# Patient Record
Sex: FEMALE | Race: WHITE | Employment: STUDENT | URBAN - NONMETROPOLITAN AREA
[De-identification: names, ages, dates, MRNs, and addresses within clinical notes are randomized per-mention and may not be internally consistent; named-entity substitution may affect disease eponyms.]

---

## 2022-11-04 ENCOUNTER — HOSPITAL ENCOUNTER (OUTPATIENT)
Dept: LAB | Age: 27
Discharge: HOME | End: 2022-11-04
Payer: COMMERCIAL

## 2022-11-04 ENCOUNTER — OFFICE VISIT (OUTPATIENT)
Dept: OBGYN | Age: 27
End: 2022-11-04

## 2022-11-04 VITALS
SYSTOLIC BLOOD PRESSURE: 118 MMHG | OXYGEN SATURATION: 98 % | HEIGHT: 67 IN | BODY MASS INDEX: 23.42 KG/M2 | RESPIRATION RATE: 16 BRPM | HEART RATE: 82 BPM | WEIGHT: 149.2 LBS | DIASTOLIC BLOOD PRESSURE: 76 MMHG

## 2022-11-04 DIAGNOSIS — Z34.90 EARLY STAGE OF PREGNANCY: ICD-10-CM

## 2022-11-04 DIAGNOSIS — Z34.90 EARLY STAGE OF PREGNANCY: Primary | ICD-10-CM

## 2022-11-04 LAB — HCG QUANTITATIVE: ABNORMAL MIU/ML

## 2022-11-04 PROCEDURE — 0500F INITIAL PRENATAL CARE VISIT: CPT | Performed by: NURSE PRACTITIONER

## 2022-11-04 PROCEDURE — 84702 CHORIONIC GONADOTROPIN TEST: CPT

## 2022-11-04 PROCEDURE — 36415 COLL VENOUS BLD VENIPUNCTURE: CPT

## 2022-11-04 RX ORDER — FAMOTIDINE 20 MG
1 TABLET ORAL DAILY
Qty: 90 TABLET | Refills: 2 | Status: SHIPPED | OUTPATIENT
Start: 2022-11-04 | End: 2023-03-04

## 2022-11-04 ASSESSMENT — ENCOUNTER SYMPTOMS
CONSTIPATION: 0
ABDOMINAL PAIN: 0
SHORTNESS OF BREATH: 0
DIARRHEA: 0

## 2022-11-04 NOTE — PROGRESS NOTES
[place] [address]  [facility] [place]   [facility] [address]  Dept: [phone]  Dept Fax: [phone]     11/4/2022        Subjective:        Willian Dumont is a 24 y.o. female here today for   Chief Complaint   Patient presents with    Amenorrhea     LMP 8/23/2022. Positive home pregnancy test.     .    Chaperone present? No, not required    Moo Luna and her  [facility] present for pregnancy confirmation visit. They believe she is likely 8-9 weeks pregnant. Current Outpatient Medications   Medication Sig Dispense Refill    Prenatal Vit-Fe Fumarate-FA (PRENATAL COMPLETE) 14-0.4 MG TABS Take 1 tablet by mouth daily 90 tablet 2     No current facility-administered medications for this visit. History reviewed. No pertinent past medical history. Past Surgical History:   Procedure Laterality Date    TONSILLECTOMY         Last PAP: 7/15/2019  HPV: NA  High Risk HPV: NA    Patient's last menstrual period was 08/23/2022 (exact date). Age of menarche: 15  Frequency: regular  Duration: 6 days   Flow is: moderate  Menstrual products: tampon and pad  Associated SX with menses: cramping  Menstrual pain: mild    Sexually active? yes  Sexual partners: single partner and male  Contraceptive method: none  STI HX: no    Infertility: no  Reproductive life plan: one child    Do you do self breast exams? No  Breast Family HX:  no family history of breast cancer  Breast Patient HX: no prior history of breast cancer, biopsy or abnormal mammograms    Mental Health / Mood: calm and cooperative    Tobacco usage:    Tobacco Use: Medium Risk    Smoking Tobacco Use: Never    Smokeless Tobacco Use: Never    Passive Exposure: Yes      ETOH usage: No current use; quit 8/2022  Recreational drug usage: no    Dental none at this time  Hearing: normal  Vision HX: normal  Immunizations: none  History of abuse: none  Activity:  walking, liftng  Diet: Eats fruits and vegetables, Dairy, and Drinks water 128 oz. Family/Friend support: Yes  Home Environment: Lives with  Isaiah Sainz and his daughter Vanessa Trivedi , 2 dogs  School/Work: [employer]  Current Stressors: none  Do you use sunscreen? Yes  Do you practice safe driving habits? Use of seatbelt Yes      Review of Systems   Constitutional:  Negative for chills, fatigue and fever. Eyes:  Negative for visual disturbance. Respiratory:  Negative for shortness of breath. Cardiovascular:  Negative for chest pain. Gastrointestinal:  Negative for abdominal pain, constipation and diarrhea. Endocrine: Negative for cold intolerance and heat intolerance. Genitourinary:  Negative for difficulty urinating, dysuria, frequency, menstrual problem, vaginal bleeding, vaginal discharge and vaginal pain. Musculoskeletal: Negative. Skin: Negative. Neurological:  Negative for headaches. Psychiatric/Behavioral: Negative. Objective:      Vitals:    11/04/22 1438   BP: 118/76   Pulse: 82   Resp: 16   SpO2: 98%        Physical Exam  Vitals and nursing note reviewed. Constitutional:       Appearance: Normal appearance. HENT:      Head: Normocephalic. Cardiovascular:      Rate and Rhythm: Normal rate and regular rhythm. Pulmonary:      Effort: Pulmonary effort is normal.      Breath sounds: Normal breath sounds. Abdominal:      General: Abdomen is flat. Bowel sounds are normal.      Palpations: Abdomen is soft. Musculoskeletal:         General: Normal range of motion. Cervical back: Normal range of motion. No tenderness. Skin:     General: Skin is warm and dry. Neurological:      General: No focal deficit present. Mental Status: She is alert and oriented to person, place, and time. Psychiatric:         Mood and Affect: Mood normal.         Behavior: Behavior normal.           Assessment:       Diagnosis Orders   1.  Early stage of pregnancy HCG, Quantitative, Pregnancy    US OB LESS THAN 14 WEEKS SINGLE OR FIRST GESTATION                           Plan:     Orders Placed This Encounter   Procedures    US OB LESS THAN 14 WEEKS SINGLE OR FIRST GESTATION           Standing Status:   Future     Standing Expiration Date:   11/4/2023     Order Specific Question:   Reason for exam:     Answer:   dating    HCG, Quantitative, Pregnancy     Standing Status:   Future     Number of Occurrences:   1     Standing Expiration Date:   11/4/2023    Dating US to be scheduled based on HCG result  Discussed delivery at [facility], prenatal classes  Discussed diet, fluids, exercise, common early pregnancy complaints, genetic family hx  PNV ordered to start immediately  Pregnancy folder and pamphlets given                Follow-up:      Return in about 2 weeks (around 11/18/2022) for f/u for initial prenatal visit in 2 wks. ADDENDUM: 1630 HCG result positive for pregnancy, pt notified. US and initial prenatal visit scheduled.       Electronically signed by MORTEZA Dickerson CNM 11/4/2022 5:47 PM .

## 2022-11-07 RX ORDER — PNV NO.95/FERROUS FUM/FOLIC AC 28MG-0.8MG
TABLET ORAL
Qty: 100 CAPSULE | Refills: 5 | Status: SHIPPED | OUTPATIENT
Start: 2022-11-07

## 2022-11-18 ENCOUNTER — HOSPITAL ENCOUNTER (OUTPATIENT)
Dept: ULTRASOUND IMAGING | Age: 27
Discharge: HOME | End: 2022-11-20
Payer: COMMERCIAL

## 2022-11-18 ENCOUNTER — HOSPITAL ENCOUNTER (OUTPATIENT)
Age: 27
Setting detail: SPECIMEN
Discharge: HOME | End: 2022-11-18
Payer: COMMERCIAL

## 2022-11-18 ENCOUNTER — INITIAL PRENATAL (OUTPATIENT)
Dept: OBGYN | Age: 27
End: 2022-11-18

## 2022-11-18 ENCOUNTER — HOSPITAL ENCOUNTER (OUTPATIENT)
Dept: LAB | Age: 27
Discharge: HOME | End: 2022-11-18
Payer: COMMERCIAL

## 2022-11-18 VITALS
BODY MASS INDEX: 23.73 KG/M2 | RESPIRATION RATE: 16 BRPM | WEIGHT: 151.2 LBS | DIASTOLIC BLOOD PRESSURE: 78 MMHG | HEART RATE: 79 BPM | SYSTOLIC BLOOD PRESSURE: 118 MMHG | HEIGHT: 67 IN | OXYGEN SATURATION: 99 %

## 2022-11-18 DIAGNOSIS — Z34.90 EARLY STAGE OF PREGNANCY: ICD-10-CM

## 2022-11-18 DIAGNOSIS — O26.811 FATIGUE DURING PREGNANCY IN FIRST TRIMESTER: ICD-10-CM

## 2022-11-18 DIAGNOSIS — Z34.92 SECOND TRIMESTER PREGNANCY: Primary | ICD-10-CM

## 2022-11-18 LAB
ABO/RH: NORMAL
ABSOLUTE EOS #: 0.03 K/UL (ref 0–0.44)
ABSOLUTE IMMATURE GRANULOCYTE: <0.03 K/UL (ref 0–0.3)
ABSOLUTE LYMPH #: 2.04 K/UL (ref 1.1–3.7)
ABSOLUTE MONO #: 0.66 K/UL (ref 0.1–1.2)
AMPHETAMINE SCREEN URINE: NEGATIVE
ANTIBODY SCREEN: NEGATIVE
BACTERIA: ABNORMAL
BARBITURATE SCREEN URINE: NEGATIVE
BASOPHILS # BLD: 0 % (ref 0–2)
BASOPHILS ABSOLUTE: 0.03 K/UL (ref 0–0.2)
BENZODIAZEPINE SCREEN, URINE: NEGATIVE
BILIRUBIN URINE: NEGATIVE
BUPRENORPHINE URINE: NEGATIVE
CANNABINOID SCREEN URINE: NEGATIVE
COCAINE METABOLITE, URINE: NEGATIVE
EOSINOPHILS RELATIVE PERCENT: 0 % (ref 1–4)
EPITHELIAL CELLS UA: ABNORMAL /HPF (ref 0–5)
GLUCOSE URINE: NEGATIVE
HCT VFR BLD CALC: 38.8 % (ref 36.3–47.1)
HEMOGLOBIN: 13.6 G/DL (ref 11.9–15.1)
HEPATITIS B SURFACE ANTIGEN: NONREACTIVE
HEPATITIS C ANTIBODY: NONREACTIVE
HIV AG/AB: NONREACTIVE
IMMATURE GRANULOCYTES: 0 %
KETONES, URINE: NEGATIVE
LEUKOCYTE ESTERASE, URINE: NEGATIVE
LYMPHOCYTES # BLD: 19 % (ref 24–43)
MCH RBC QN AUTO: 31.2 PG (ref 25.2–33.5)
MCHC RBC AUTO-ENTMCNC: 35.1 G/DL (ref 25.2–33.5)
MCV RBC AUTO: 89 FL (ref 82.6–102.9)
METHADONE SCREEN, URINE: NEGATIVE
METHAMPHETAMINE, URINE: NEGATIVE
MONOCYTES # BLD: 6 % (ref 3–12)
NITRITE, URINE: NEGATIVE
OPIATES, URINE: NEGATIVE
OXYCODONE SCREEN URINE: NEGATIVE
PDW BLD-RTO: 11.7 % (ref 11.8–14.4)
PH UA: 5.5 (ref 5–6)
PHENCYCLIDINE, URINE: NEGATIVE
PLATELET # BLD: 342 K/UL (ref 138–453)
PMV BLD AUTO: 9.1 FL (ref 8.1–13.5)
PROPOXYPHENE, URINE: NEGATIVE
PROTEIN UA: NEGATIVE
RBC # BLD: 4.36 M/UL (ref 3.95–5.11)
RBC UA: ABNORMAL /HPF (ref 0–4)
RUBV IGG SER QL: >500 IU/ML
SEG NEUTROPHILS: 75 % (ref 36–65)
SEGMENTED NEUTROPHILS ABSOLUTE COUNT: 8.14 K/UL (ref 1.5–8.1)
SPECIFIC GRAVITY UA: 1 (ref 1.01–1.02)
T. PALLIDUM, IGG: NONREACTIVE
TEST INFORMATION: NORMAL
TRICYCLIC ANTIDEPRESSANTS, UR: NEGATIVE
URINE HGB: ABNORMAL
UROBILINOGEN, URINE: NORMAL
VITAMIN D 25-HYDROXY: 38.5 NG/ML
WBC # BLD: 10.9 K/UL (ref 3.5–11.3)
WBC UA: ABNORMAL /HPF (ref 0–4)

## 2022-11-18 PROCEDURE — 85025 COMPLETE CBC W/AUTO DIFF WBC: CPT

## 2022-11-18 PROCEDURE — 86762 RUBELLA ANTIBODY: CPT

## 2022-11-18 PROCEDURE — 86850 RBC ANTIBODY SCREEN: CPT

## 2022-11-18 PROCEDURE — 80306 DRUG TEST PRSMV INSTRMNT: CPT

## 2022-11-18 PROCEDURE — 87591 N.GONORRHOEAE DNA AMP PROB: CPT

## 2022-11-18 PROCEDURE — 87491 CHLMYD TRACH DNA AMP PROBE: CPT

## 2022-11-18 PROCEDURE — 82306 VITAMIN D 25 HYDROXY: CPT

## 2022-11-18 PROCEDURE — 86803 HEPATITIS C AB TEST: CPT

## 2022-11-18 PROCEDURE — 86900 BLOOD TYPING SEROLOGIC ABO: CPT

## 2022-11-18 PROCEDURE — G0145 SCR C/V CYTO,THINLAYER,RESCR: HCPCS

## 2022-11-18 PROCEDURE — 86787 VARICELLA-ZOSTER ANTIBODY: CPT

## 2022-11-18 PROCEDURE — 0502F SUBSEQUENT PRENATAL CARE: CPT | Performed by: NURSE PRACTITIONER

## 2022-11-18 PROCEDURE — 36415 COLL VENOUS BLD VENIPUNCTURE: CPT

## 2022-11-18 PROCEDURE — 81001 URINALYSIS AUTO W/SCOPE: CPT

## 2022-11-18 PROCEDURE — 87086 URINE CULTURE/COLONY COUNT: CPT

## 2022-11-18 PROCEDURE — 86901 BLOOD TYPING SEROLOGIC RH(D): CPT

## 2022-11-18 PROCEDURE — 87389 HIV-1 AG W/HIV-1&-2 AB AG IA: CPT

## 2022-11-18 PROCEDURE — 86780 TREPONEMA PALLIDUM: CPT

## 2022-11-18 PROCEDURE — 87340 HEPATITIS B SURFACE AG IA: CPT

## 2022-11-18 PROCEDURE — 76801 OB US < 14 WKS SINGLE FETUS: CPT

## 2022-11-18 ASSESSMENT — PATIENT HEALTH QUESTIONNAIRE - PHQ9
SUM OF ALL RESPONSES TO PHQ QUESTIONS 1-9: 0
1. LITTLE INTEREST OR PLEASURE IN DOING THINGS: 0
SUM OF ALL RESPONSES TO PHQ QUESTIONS 1-9: 0
SUM OF ALL RESPONSES TO PHQ9 QUESTIONS 1 & 2: 0
SUM OF ALL RESPONSES TO PHQ QUESTIONS 1-9: 0
SUM OF ALL RESPONSES TO PHQ QUESTIONS 1-9: 0
2. FEELING DOWN, DEPRESSED OR HOPELESS: 0

## 2022-11-18 NOTE — LETTER
[address] OB GYN A department of [place] [address]  Phone: [phone]  Fax: [phone]    Lluvia Stout, [place]        November 18, 2022     Patient: Tay Chairez   YOB: 1995   Date of Visit: 11/18/2022       To Whom it May Concern:    Fatimah Sanabria was seen in my clinic on 11/18/2022. She may return to work on 11/18/2022    If you have any questions or concerns, please don't hesitate to call.     Sincerely,         MORTEZA Mullins CNM

## 2022-11-18 NOTE — LETTER
[address] OB GYN A department of [place] [address]  Phone: [phone]  Fax: Hali Cheung 1941, APRN - [place]        November 18, 2022     Patient: Andrew Chambers   YOB: 1995   Date of Visit: 11/18/2022       To Whom It May Concern: It is my medical opinion that Footnote {Work release (duty restriction):50670}. If you have any questions or concerns, please don't hesitate to call.     Sincerely,        MORTEZA Peoples CNM

## 2022-11-18 NOTE — PROGRESS NOTES
Marion Small Flogaus  2022    YOB: 2020   Patient's last menstrual period was 2022 (exact date). Unknown        Primary Care Physician: Norma Petty MD        CC: Initial Prenatal Visit    Subjective:     Thalia Edwards is a 24 y.o. female Mel Coto    is being seen today for her first obstetrical visit. This is a planned pregnancy. She is at [address] [place] [address]. Her obstetrical history has no current OB risk factors. Relationship with FOB: spouse, living together. Patient does intend to breast feed. Pregnancy history fully reviewed. Objective:   Blood pressure 118/78, pulse 79, resp. rate 16, height 5' 7\" (1.702 m), weight 151 lb 3.2 oz (68.6 kg), last menstrual period 2022, SpO2 99 %, not currently breastfeeding. OB History    Para Term  AB Living   1 0 0 0 0 0   SAB IAB Ectopic Molar Multiple Live Births   0 0 0 0 0 0      # Outcome Date GA Lbr Murray/2nd Weight Sex Delivery Anes PTL Lv   1 Current                History reviewed. No pertinent past medical history.   Past Surgical History:   Procedure Laterality Date    TONSILLECTOMY        Social History     Socioeconomic History    Marital status: Single     Spouse name: Not on file    Number of children: Not on file    Years of education: Not on file    Highest education level: Not on file   Occupational History    Not on file   Tobacco Use    Smoking status: Never     Passive exposure: Yes    Smokeless tobacco: Never   Vaping Use    Vaping Use: Never used   Substance and Sexual Activity    Alcohol use: No    Drug use: No    Sexual activity: Yes     Partners: Male     Birth control/protection: Pill   Other Topics Concern    Not on file   Social History Narrative    Not on file     Social Determinants of Health     Financial Resource Strain: Not on file   Food Insecurity: Not on file   Transportation Needs: Not on file   Physical Activity: Not on file   Stress: Not on file   Social Connections: Not on file   Intimate Partner Violence: Not on file   Housing Stability: Not on file     Family History   Problem Relation Age of Onset    Zuniga-Parkinson-White Syndrome Maternal Grandmother     Diabetes Maternal Grandfather     Seizures Paternal Grandmother     Diabetes Paternal Grandfather     High Blood Pressure Paternal Grandfather        MEDICATIONS:  Current Outpatient Medications   Medication Sig Dispense Refill    Prenatal MV-Min-Fe Fum-FA-DHA (PRENATAL MULTIVITAMIN PLUS DHA) 27-0.8-250 MG CAPS One tablet daily by mouth 100 capsule 5    Prenatal Vit-Fe Fumarate-FA (PRENATAL COMPLETE) 14-0.4 MG TABS Take 1 tablet by mouth daily 90 tablet 2     No current facility-administered medications for this visit. ALLERGIES:  Allergies as of 11/18/2022 - Fully Reviewed 11/18/2022   Allergen Reaction Noted    Penicillins  07/26/2013     Physical Exam Completed-See Epic Navigator    Chaperone for Intimate Exam  Chaperone was offered and accepted as part of the rooming process. Chaperone: DELBERT Short LPN    Assessment:      Pregnancy at 12 and 3/7 weeks    Diagnosis Orders   1. Second trimester pregnancy  US OB 14 PLUS WEEKS SINGLE OR FIRST GESTATION      2. Early stage of pregnancy  C.trachomatis N.gonorrhoeae DNA, Urine    Culture, Urine    Hepatitis C Antibody    HIV Screen    Prenatal Profile I    Urinalysis with Microscopic    Urine Drug Screen    Varicella Zoster Antibody, IgG    Vitamin D 25 Hydroxy    PAP SMEAR    Panorama Prenatal Test Full Panel: Panorama Test Plus 5 Additional Microdeletions      3. Fatigue during pregnancy in first trimester             Plan:      Initial labs drawn. Continue Prenatal vitamins. Problem list reviewed and updated. Role of ultrasound in pregnancy discussed; fetal survey: ordered  NIPT Testing completed today  Pap testing completed today  If Negative Cytology, Follow-up screening per current guidelines.    Follow-up in 4 weeks    MORTEZA Dickerson - CNM

## 2022-11-19 LAB
CULTURE: NO GROWTH
SPECIMEN DESCRIPTION: NORMAL

## 2022-11-21 LAB
C. TRACHOMATIS DNA ,URINE: NEGATIVE
N. GONORRHOEAE DNA, URINE: NEGATIVE
SPECIMEN DESCRIPTION: NORMAL
VZV IGG SER QL IA: 2.57

## 2022-12-05 NOTE — RESULT ENCOUNTER NOTE
Results reviewed, please contact patient with normal result. Baby is low risk for any of the chromosomal alterations that were tested. Do not check report in My Chart if gender is to remain secret.  LYNN/HORACIO

## 2022-12-12 ENCOUNTER — ROUTINE PRENATAL (OUTPATIENT)
Dept: OBGYN | Age: 27
End: 2022-12-12

## 2022-12-12 VITALS
HEART RATE: 100 BPM | WEIGHT: 151.4 LBS | OXYGEN SATURATION: 99 % | BODY MASS INDEX: 23.76 KG/M2 | DIASTOLIC BLOOD PRESSURE: 62 MMHG | SYSTOLIC BLOOD PRESSURE: 112 MMHG | HEIGHT: 67 IN | RESPIRATION RATE: 16 BRPM

## 2022-12-12 DIAGNOSIS — Z34.91 NORMAL PREGNANCY, FIRST TRIMESTER: Primary | ICD-10-CM

## 2022-12-12 PROCEDURE — 0502F SUBSEQUENT PRENATAL CARE: CPT | Performed by: NURSE PRACTITIONER

## 2022-12-12 NOTE — PROGRESS NOTES
Baltazar Berman is here at 15w5d for:    Chief Complaint   Patient presents with    Routine Prenatal Visit       S: Layne Merlos and  Vasyl present for return OB visit. She denies VB, LOF, cramping/ctx. No fetal movement felt yet. Fatigue is much improved. They do NOT want to know baby's gender. She denies any questions/concerns. O: Prenatal labs all WNL, pap done 22: neg  A:  @ 15w5d  P: 1. RTC in 4 weeks  2. Anatomy US before next visit on 23  3. Education: physical changes, safe exercise, safe sex, work duties, always wear seatbelt    MORTEZA Mullins CNM    Pt denies VB, LOF, cramping/ctx, headache, vision changes, fever, chills, chest pain, shortness of breath, abdominal pain, urgency/burning with urination, nausea and vomiting: yes  Pt reports fetal movement: No    Abdomen: gravid   Pelvic Exam: deferred    See Prenatal Visit Tab for prenatal physical, vital signs and fetal assessment section    ASSESSMENT & Plan    Diagnosis Orders   1. Normal pregnancy, first trimester  US OB DETAIL FETAL ANATOMY SINGLE OR FIRST GESTATION        Return for next prenatal visit in 4 weeks.     20 minutes spent in education, evaluation, and assessment    Saud Mullins 9:02 AM

## 2023-01-17 ENCOUNTER — HOSPITAL ENCOUNTER (OUTPATIENT)
Dept: ULTRASOUND IMAGING | Age: 28
Discharge: HOME | End: 2023-01-19
Payer: COMMERCIAL

## 2023-01-17 ENCOUNTER — ROUTINE PRENATAL (OUTPATIENT)
Dept: OBGYN | Age: 28
End: 2023-01-17

## 2023-01-17 ENCOUNTER — HOSPITAL ENCOUNTER (OUTPATIENT)
Age: 28
Discharge: HOME | End: 2023-01-17
Payer: COMMERCIAL

## 2023-01-17 VITALS
WEIGHT: 156.4 LBS | SYSTOLIC BLOOD PRESSURE: 112 MMHG | HEIGHT: 67 IN | OXYGEN SATURATION: 99 % | DIASTOLIC BLOOD PRESSURE: 62 MMHG | HEART RATE: 75 BPM | BODY MASS INDEX: 24.55 KG/M2

## 2023-01-17 DIAGNOSIS — Z34.92 PRENATAL CARE, SECOND TRIMESTER: ICD-10-CM

## 2023-01-17 DIAGNOSIS — Z34.92 PRENATAL CARE, SECOND TRIMESTER: Primary | ICD-10-CM

## 2023-01-17 DIAGNOSIS — Z34.92 SECOND TRIMESTER PREGNANCY: ICD-10-CM

## 2023-01-17 LAB — TSH SERPL DL<=0.05 MIU/L-ACNC: 1.89 UIU/ML (ref 0.3–5)

## 2023-01-17 PROCEDURE — 0500F INITIAL PRENATAL CARE VISIT: CPT | Performed by: NURSE PRACTITIONER

## 2023-01-17 PROCEDURE — 76805 OB US >/= 14 WKS SNGL FETUS: CPT

## 2023-01-17 PROCEDURE — 84443 ASSAY THYROID STIM HORMONE: CPT

## 2023-01-17 PROCEDURE — 36415 COLL VENOUS BLD VENIPUNCTURE: CPT

## 2023-01-17 PROCEDURE — 82105 ALPHA-FETOPROTEIN SERUM: CPT

## 2023-01-17 NOTE — PROGRESS NOTES
S: Mark Fuentes presents for prenatal visit today with  Vasyl. Feeling well. Not yet feeling baby move. Reports no contractions. denies concerns. Denies VB, LOF. O: MVSS, Afebrile. Abdomen gravid, nontender, S=D, +FHT's. Anatomy  US = 20 Weeks 2 Days = EDC 6/4/2023, .88 gm. +/- 53.68 gm., (13# 0 oz. +/- 2# 0 oz. ), 20.9%, . Anterior placenta. A: 24 y.o. Delmis Boone at 19w11d SIUP, +FHT's   P: Education: discussed and reviewed PTL, danger S&S, when to call. Discussed preliminary Anatomy US report with pt at visit. Discussed Nausea/fatigue improved; may start feeling fetal movement; may have sleep problems or leg cramps, Tx: exercise, warm shower/bath, extra pillows, stretches and heat for leg cramps. Discussed 28w visit: 1h glucola, CBC  Return for f/u at next prenatal visit in 4 weeks. with Vernadine Niki  20 minutes spent in education, evaluation, and assessment. Dermal Autograft Text: The defect edges were debeveled with a #15 scalpel blade.  Given the location of the defect, shape of the defect and the proximity to free margins a dermal autograft was deemed most appropriate.  Using a sterile surgical marker, the primary defect shape was transferred to the donor site. The area thus outlined was incised deep to adipose tissue with a #15 scalpel blade.  The harvested graft was then trimmed of adipose and epidermal tissue until only dermis was left.  The skin graft was then placed in the primary defect and oriented appropriately.

## 2023-01-19 NOTE — RESULT ENCOUNTER NOTE
Results reviewed, notify pt that result is still WNL but US tech was unable to completely see the heart. A limited f/u US may be done to visualize the entire heart well.  LYNN/HORACIO

## 2023-01-20 LAB
AFP INTERPRETATION: NORMAL
AFP MOM: 0.78
AFP SPECIMEN: NORMAL
AFP: 53 NG/ML
DATE OF BIRTH: NORMAL
DATING METHOD: NORMAL
DETERMINED BY: NORMAL
DIABETIC: NEGATIVE
DONOR EGG?: NORMAL
DUE DATE: NORMAL
ESTIMATED DUE DATE: NORMAL
FAMILY HISTORY NTD: NEGATIVE
GESTATIONAL AGE: NORMAL
IN VITRO FERTILIZATION: NORMAL
INSULIN REQ DIABETES: NO
LAST MENSTRUAL PERIOD: NORMAL
MATERNAL AGE AT EDD: 25.9 YR
MATERNAL WEIGHT: 156
MONOCHORIONIC TWINS: NORMAL
NUMBER OF FETUSES: NORMAL
PATIENT WEIGHT UNITS: NORMAL
PATIENT WEIGHT: NORMAL
RACE (MATERNAL): NORMAL
RACE: NORMAL
REPEAT SPECIMEN?: NORMAL
SMOKING: NORMAL
SMOKING: NORMAL
VALPROIC/CARBAMAZEP: NORMAL
ZZ NTE CLEAN UP: HISTORY: NO

## 2023-01-20 NOTE — RESULT ENCOUNTER NOTE
Results reviewed, please contact patient with normal result: negative screen for open spina bifida.  LYNN/HORACIO

## 2023-02-14 ENCOUNTER — ROUTINE PRENATAL (OUTPATIENT)
Dept: OBGYN | Age: 28
End: 2023-02-14

## 2023-02-14 VITALS
BODY MASS INDEX: 24.93 KG/M2 | WEIGHT: 159.2 LBS | SYSTOLIC BLOOD PRESSURE: 120 MMHG | DIASTOLIC BLOOD PRESSURE: 70 MMHG | HEART RATE: 90 BPM

## 2023-02-14 DIAGNOSIS — Z3A.25 25 WEEKS GESTATION OF PREGNANCY: ICD-10-CM

## 2023-02-14 DIAGNOSIS — Z34.92 SECOND TRIMESTER PREGNANCY: Primary | ICD-10-CM

## 2023-02-14 PROCEDURE — 0502F SUBSEQUENT PRENATAL CARE: CPT | Performed by: ADVANCED PRACTICE MIDWIFE

## 2023-02-14 NOTE — PROGRESS NOTES
S:  Presents for prenatal visit today. Reports feeling well, no concerns. Perceiving fetal movements, no concerns. O:   MVSS, Afebrile. Abdomen gravid, nontender. S=D, +FHT's, +FM  A:  23 yo  at 25 Weeks 0 Days SIUP, +FHT's  P:  Education: Discussed and reviewed diet, hydration, expected weight gain 1/2#/week, FMI, danger s&S, pre-registration paperwork for [facility] given and gift packs. 28 weeks labs at [place]. RTO 3 weeks.

## 2023-03-07 ENCOUNTER — HOSPITAL ENCOUNTER (OUTPATIENT)
Age: 28
Discharge: HOME | End: 2023-03-07
Payer: COMMERCIAL

## 2023-03-07 ENCOUNTER — ROUTINE PRENATAL (OUTPATIENT)
Dept: OBGYN | Age: 28
End: 2023-03-07

## 2023-03-07 VITALS
OXYGEN SATURATION: 99 % | HEIGHT: 67 IN | RESPIRATION RATE: 16 BRPM | WEIGHT: 166.8 LBS | SYSTOLIC BLOOD PRESSURE: 120 MMHG | HEART RATE: 98 BPM | DIASTOLIC BLOOD PRESSURE: 72 MMHG | BODY MASS INDEX: 26.18 KG/M2

## 2023-03-07 DIAGNOSIS — Z3A.28 28 WEEKS GESTATION OF PREGNANCY: ICD-10-CM

## 2023-03-07 DIAGNOSIS — Z34.92 PRENATAL CARE, SECOND TRIMESTER: Primary | ICD-10-CM

## 2023-03-07 DIAGNOSIS — Z34.92 SECOND TRIMESTER PREGNANCY: ICD-10-CM

## 2023-03-07 LAB
ABSOLUTE EOS #: 0.07 K/UL (ref 0–0.44)
ABSOLUTE IMMATURE GRANULOCYTE: 0.08 K/UL (ref 0–0.3)
ABSOLUTE LYMPH #: 1.49 K/UL (ref 1.1–3.7)
ABSOLUTE MONO #: 0.69 K/UL (ref 0.1–1.2)
BASOPHILS # BLD: 0 % (ref 0–2)
BASOPHILS ABSOLUTE: 0.04 K/UL (ref 0–0.2)
EOSINOPHILS RELATIVE PERCENT: 1 % (ref 1–4)
GLUCOSE ADMINISTRATION: NORMAL
GLUCOSE TOLERANCE SCREEN 50G: 98 MG/DL (ref 70–135)
HCT VFR BLD AUTO: 36.3 % (ref 36.3–47.1)
HGB BLD-MCNC: 12.3 G/DL (ref 11.9–15.1)
IMMATURE GRANULOCYTES: 1 %
LYMPHOCYTES # BLD: 14 % (ref 24–43)
MCH RBC QN AUTO: 32.2 PG (ref 25.2–33.5)
MCHC RBC AUTO-ENTMCNC: 33.9 G/DL (ref 25.2–33.5)
MCV RBC AUTO: 95 FL (ref 82.6–102.9)
MONOCYTES # BLD: 7 % (ref 3–12)
NRBC AUTOMATED: 0 PER 100 WBC
PDW BLD-RTO: 12.5 % (ref 11.8–14.4)
PLATELET # BLD AUTO: 302 K/UL (ref 138–453)
PMV BLD AUTO: 9.5 FL (ref 8.1–13.5)
RBC # BLD: 3.82 M/UL (ref 3.95–5.11)
SEG NEUTROPHILS: 77 % (ref 36–65)
SEGMENTED NEUTROPHILS ABSOLUTE COUNT: 8.05 K/UL (ref 1.5–8.1)
WBC # BLD AUTO: 10.4 K/UL (ref 3.5–11.3)

## 2023-03-07 PROCEDURE — 82950 GLUCOSE TEST: CPT

## 2023-03-07 PROCEDURE — 36415 COLL VENOUS BLD VENIPUNCTURE: CPT

## 2023-03-07 PROCEDURE — 85025 COMPLETE CBC W/AUTO DIFF WBC: CPT

## 2023-03-07 RX ORDER — IBUPROFEN 800 MG
TABLET ORAL
COMMUNITY

## 2023-03-07 SDOH — ECONOMIC STABILITY: INCOME INSECURITY: HOW HARD IS IT FOR YOU TO PAY FOR THE VERY BASICS LIKE FOOD, HOUSING, MEDICAL CARE, AND HEATING?: NOT HARD AT ALL

## 2023-03-07 SDOH — ECONOMIC STABILITY: FOOD INSECURITY: WITHIN THE PAST 12 MONTHS, YOU WORRIED THAT YOUR FOOD WOULD RUN OUT BEFORE YOU GOT MONEY TO BUY MORE.: NEVER TRUE

## 2023-03-07 SDOH — ECONOMIC STABILITY: FOOD INSECURITY: WITHIN THE PAST 12 MONTHS, THE FOOD YOU BOUGHT JUST DIDN'T LAST AND YOU DIDN'T HAVE MONEY TO GET MORE.: NEVER TRUE

## 2023-03-07 SDOH — ECONOMIC STABILITY: HOUSING INSECURITY
IN THE LAST 12 MONTHS, WAS THERE A TIME WHEN YOU DID NOT HAVE A STEADY PLACE TO SLEEP OR SLEPT IN A SHELTER (INCLUDING NOW)?: NO

## 2023-03-07 NOTE — PROGRESS NOTES
Tdap vaccine given  NDC: 47786-906-60  LOT: [id number]  EXP: 06/13/2025  SITE: Left deltoid  PATIENT TOLERATED WELL

## 2023-03-14 DIAGNOSIS — Z3A.29 29 WEEKS GESTATION OF PREGNANCY: Primary | ICD-10-CM

## 2023-03-21 ENCOUNTER — ROUTINE PRENATAL (OUTPATIENT)
Dept: OBGYN | Age: 28
End: 2023-03-21

## 2023-03-21 VITALS
HEART RATE: 111 BPM | WEIGHT: 169.6 LBS | DIASTOLIC BLOOD PRESSURE: 70 MMHG | BODY MASS INDEX: 26.62 KG/M2 | HEIGHT: 67 IN | SYSTOLIC BLOOD PRESSURE: 110 MMHG

## 2023-03-21 DIAGNOSIS — Z34.93 PRENATAL CARE, THIRD TRIMESTER: Primary | ICD-10-CM

## 2023-03-21 DIAGNOSIS — K21.00 GASTROESOPHAGEAL REFLUX DISEASE WITH ESOPHAGITIS WITHOUT HEMORRHAGE: ICD-10-CM

## 2023-03-21 DIAGNOSIS — Z3A.30 30 WEEKS GESTATION OF PREGNANCY: ICD-10-CM

## 2023-03-21 PROCEDURE — 0502F SUBSEQUENT PRENATAL CARE: CPT | Performed by: ADVANCED PRACTICE MIDWIFE

## 2023-03-21 RX ORDER — OMEPRAZOLE 20 MG/1
20 TABLET, DELAYED RELEASE ORAL 2 TIMES DAILY PRN
Qty: 60 TABLET | Refills: 3 | Status: SHIPPED | OUTPATIENT
Start: 2023-03-21

## 2023-03-21 NOTE — PROGRESS NOTES
S:  Presents for prenatal visit today. Reports N/V associated with GERD. Baby is active no contractions noted. No other concerns. O:  MVSS, afebrile, abdomen gravid, nontender. S=D, +FHT's,, +FM  A:  23 yo  at 30 Weeks 0 Days SIUP, +FHT's  P:  Education: Reviewed PTL, FMI, peds, plans on natural childbirth, declines childbirth education classes. Growth US in 2 weeks.  RTO 2 weeks

## 2023-03-23 ENCOUNTER — HOSPITAL ENCOUNTER (OUTPATIENT)
Dept: ULTRASOUND IMAGING | Age: 28
Discharge: HOME | End: 2023-03-25
Payer: COMMERCIAL

## 2023-03-23 DIAGNOSIS — Z3A.29 29 WEEKS GESTATION OF PREGNANCY: ICD-10-CM

## 2023-03-23 PROCEDURE — 76805 OB US >/= 14 WKS SNGL FETUS: CPT

## 2023-03-28 DIAGNOSIS — Z34.93 PRENATAL CARE, THIRD TRIMESTER: Primary | ICD-10-CM

## 2023-04-05 ENCOUNTER — HOSPITAL ENCOUNTER (OUTPATIENT)
Dept: ULTRASOUND IMAGING | Age: 28
Discharge: HOME | End: 2023-04-07
Payer: COMMERCIAL

## 2023-04-05 ENCOUNTER — ROUTINE PRENATAL (OUTPATIENT)
Dept: OBGYN | Age: 28
End: 2023-04-05

## 2023-04-05 VITALS
HEIGHT: 67 IN | BODY MASS INDEX: 27.25 KG/M2 | SYSTOLIC BLOOD PRESSURE: 120 MMHG | HEART RATE: 95 BPM | WEIGHT: 173.6 LBS | DIASTOLIC BLOOD PRESSURE: 62 MMHG

## 2023-04-05 DIAGNOSIS — Z34.93 PRENATAL CARE, THIRD TRIMESTER: ICD-10-CM

## 2023-04-05 DIAGNOSIS — K21.00 GASTROESOPHAGEAL REFLUX DISEASE WITH ESOPHAGITIS WITHOUT HEMORRHAGE: ICD-10-CM

## 2023-04-05 DIAGNOSIS — Z34.93 PRENATAL CARE, THIRD TRIMESTER: Primary | ICD-10-CM

## 2023-04-05 DIAGNOSIS — Z3A.32 32 WEEKS GESTATION OF PREGNANCY: ICD-10-CM

## 2023-04-05 PROCEDURE — 76805 OB US >/= 14 WKS SNGL FETUS: CPT

## 2023-04-05 PROCEDURE — 0502F SUBSEQUENT PRENATAL CARE: CPT | Performed by: ADVANCED PRACTICE MIDWIFE

## 2023-04-05 NOTE — PROGRESS NOTES
S:  Presents for prenatal visit today. Reports baby is active and no contractions. No concerns. O:  MVSS, Afebrile. Abdomen gravid, nontender, S=D, US = 32 Weeks 2 Days = +/- 2 Weeks 2 Days EDC 2023, EFW 1864 g. +/- 279.57 g., (4# 2 oz. +/- 10 oz.), 31.9%, YOANNA 15.55 cm.,   A:  25 yo  at 32 Weeks 1 Day sIUP, Growth CWD, +FHT's  P:  Education: reviewed growth US,  diet, hydration, continue with PNV daily, vitamin D3, FMI, PTL, danger S&S. RTO 2 weeks.

## 2023-04-18 ENCOUNTER — ROUTINE PRENATAL (OUTPATIENT)
Dept: OBGYN | Age: 28
End: 2023-04-18

## 2023-04-18 VITALS
WEIGHT: 180 LBS | RESPIRATION RATE: 16 BRPM | SYSTOLIC BLOOD PRESSURE: 120 MMHG | BODY MASS INDEX: 28.25 KG/M2 | HEIGHT: 67 IN | HEART RATE: 92 BPM | DIASTOLIC BLOOD PRESSURE: 74 MMHG

## 2023-04-18 DIAGNOSIS — Z34.93 PRENATAL CARE, THIRD TRIMESTER: Primary | ICD-10-CM

## 2023-04-18 DIAGNOSIS — Z3A.34 34 WEEKS GESTATION OF PREGNANCY: ICD-10-CM

## 2023-04-18 PROCEDURE — 0502F SUBSEQUENT PRENATAL CARE: CPT | Performed by: ADVANCED PRACTICE MIDWIFE

## 2023-04-18 ASSESSMENT — PATIENT HEALTH QUESTIONNAIRE - PHQ9
SUM OF ALL RESPONSES TO PHQ QUESTIONS 1-9: 0
SUM OF ALL RESPONSES TO PHQ9 QUESTIONS 1 & 2: 0
2. FEELING DOWN, DEPRESSED OR HOPELESS: 0
1. LITTLE INTEREST OR PLEASURE IN DOING THINGS: 0
SUM OF ALL RESPONSES TO PHQ QUESTIONS 1-9: 0

## 2023-04-18 NOTE — PROGRESS NOTES
S:  Presents for prenatal visit today. Reports no concerns at this time. Baby is active. PNV, vitamin D 3 daily. O:  MVSS, Afebrile. Abdomen gravid, nontender. S=D, + FHT's, +FM  A:  25 yo  at 34 Weeks 1 Days SIUP, +FHT's  P:  Education: discussed and reviewed labor S&S, when to call and how to contact provider.  Ds'd [facility] closure and delivery at other hospital.

## 2023-05-02 ENCOUNTER — HOSPITAL ENCOUNTER (OUTPATIENT)
Age: 28
Setting detail: SPECIMEN
Discharge: HOME | End: 2023-05-02
Payer: COMMERCIAL

## 2023-05-02 ENCOUNTER — ROUTINE PRENATAL (OUTPATIENT)
Dept: OBGYN | Age: 28
End: 2023-05-02
Payer: COMMERCIAL

## 2023-05-02 VITALS
SYSTOLIC BLOOD PRESSURE: 110 MMHG | HEART RATE: 96 BPM | BODY MASS INDEX: 29.1 KG/M2 | DIASTOLIC BLOOD PRESSURE: 70 MMHG | WEIGHT: 185.8 LBS

## 2023-05-02 DIAGNOSIS — B37.31 YEAST INFECTION INVOLVING THE VAGINA AND SURROUNDING AREA: ICD-10-CM

## 2023-05-02 DIAGNOSIS — Z3A.36 36 WEEKS GESTATION OF PREGNANCY: ICD-10-CM

## 2023-05-02 DIAGNOSIS — Z34.93 PRENATAL CARE, THIRD TRIMESTER: Primary | ICD-10-CM

## 2023-05-02 DIAGNOSIS — Z34.93 PRENATAL CARE, THIRD TRIMESTER: ICD-10-CM

## 2023-05-02 PROCEDURE — 87081 CULTURE SCREEN ONLY: CPT

## 2023-05-02 PROCEDURE — 86403 PARTICLE AGGLUT ANTBDY SCRN: CPT

## 2023-05-02 PROCEDURE — 99213 OFFICE O/P EST LOW 20 MIN: CPT

## 2023-05-02 PROCEDURE — 0502F SUBSEQUENT PRENATAL CARE: CPT | Performed by: ADVANCED PRACTICE MIDWIFE

## 2023-05-02 RX ORDER — FLUCONAZOLE 150 MG/1
TABLET ORAL
Qty: 2 TABLET | Refills: 1 | Status: SHIPPED | OUTPATIENT
Start: 2023-05-02

## 2023-05-02 NOTE — PROGRESS NOTES
S:  Presents for prenatal visit today. Generally feeling well. Baby is active and occasional contractions. O:  MVSS, Afebrile. Abdomen gravid, nontender. S=D, +FHT's, +FM, Genital GBS culture obtained. SVE 1 cm/50%/-1 station, note moderate amount thick yeast like discharge. A:  25 yo  at 36 Weeks 0 Days SIUP, Yeast Infection Involving Vagina, +FHT's, +FM  P:  Education: discussed and reviewed labor S&S, when to call and how to contact CNM. Will treat yeast infection. RTO 1 week.

## 2023-05-03 LAB
MICROORGANISM SPEC CULT: ABNORMAL
SPECIMEN DESCRIPTION: ABNORMAL

## 2023-05-11 ENCOUNTER — ROUTINE PRENATAL (OUTPATIENT)
Dept: OBGYN | Age: 28
End: 2023-05-11

## 2023-05-11 VITALS
BODY MASS INDEX: 29.66 KG/M2 | SYSTOLIC BLOOD PRESSURE: 120 MMHG | HEART RATE: 89 BPM | DIASTOLIC BLOOD PRESSURE: 70 MMHG | WEIGHT: 189 LBS | HEIGHT: 67 IN

## 2023-05-11 DIAGNOSIS — Z3A.37 37 WEEKS GESTATION OF PREGNANCY: ICD-10-CM

## 2023-05-11 DIAGNOSIS — Z34.93 PRENATAL CARE, THIRD TRIMESTER: Primary | ICD-10-CM

## 2023-05-11 NOTE — PROGRESS NOTES
S:  Presents for prenatal visit today. Reports some ankle swelling walk, no headache or visual changes. Reports baby is active. O:  MVSS, Afebrile. Abdomen gravid, nontender. S=D, +FHT's, +FM, SVE deferred. A:  23 yo  at 37 Weeks 2 Days SIUP, +FHT's  P:  Education: discussed and reviewed labor S&S, when to call and how to contact CNM. Ds'd perineal stretches. RTO weekly.

## 2023-05-16 ENCOUNTER — ROUTINE PRENATAL (OUTPATIENT)
Dept: OBGYN | Age: 28
End: 2023-05-16

## 2023-05-16 VITALS
WEIGHT: 188 LBS | SYSTOLIC BLOOD PRESSURE: 122 MMHG | BODY MASS INDEX: 29.51 KG/M2 | HEIGHT: 67 IN | HEART RATE: 85 BPM | DIASTOLIC BLOOD PRESSURE: 88 MMHG

## 2023-05-16 DIAGNOSIS — Z3A.38 38 WEEKS GESTATION OF PREGNANCY: ICD-10-CM

## 2023-05-16 DIAGNOSIS — Z34.93 PRENATAL CARE, THIRD TRIMESTER: Primary | ICD-10-CM

## 2023-05-16 PROCEDURE — 0502F SUBSEQUENT PRENATAL CARE: CPT | Performed by: ADVANCED PRACTICE MIDWIFE

## 2023-05-16 NOTE — PROGRESS NOTES
S:  Presents for prenatal visit today. Baby is active and occasional contractions. No change in discharge. O:  MVSS, Afebrile. Abdomen gravid, nontender. +FHT's, +FM. SVE deferred  A:  23 yo  at 38 Weeks 0 Days SIUP, +FHT's  P:  Education: discussed and reviewed labor S&S, when to call and how to contract CNM. RTO 1 week.

## 2023-05-23 ENCOUNTER — HOSPITAL ENCOUNTER (OUTPATIENT)
Age: 28
Discharge: HOME | End: 2023-05-23
Payer: COMMERCIAL

## 2023-05-23 ENCOUNTER — ROUTINE PRENATAL (OUTPATIENT)
Dept: OBGYN | Age: 28
End: 2023-05-23

## 2023-05-23 VITALS
SYSTOLIC BLOOD PRESSURE: 132 MMHG | WEIGHT: 191.6 LBS | HEIGHT: 67 IN | BODY MASS INDEX: 30.07 KG/M2 | HEART RATE: 108 BPM | DIASTOLIC BLOOD PRESSURE: 89 MMHG

## 2023-05-23 DIAGNOSIS — O13.3 GESTATIONAL HYPERTENSION, THIRD TRIMESTER: ICD-10-CM

## 2023-05-23 DIAGNOSIS — Z3A.39 39 WEEKS GESTATION OF PREGNANCY: ICD-10-CM

## 2023-05-23 DIAGNOSIS — Z34.93 PRENATAL CARE, THIRD TRIMESTER: ICD-10-CM

## 2023-05-23 DIAGNOSIS — Z34.93 PRENATAL CARE, THIRD TRIMESTER: Primary | ICD-10-CM

## 2023-05-23 LAB
ALBUMIN SERPL-MCNC: 4.1 G/DL (ref 3.5–5.2)
ALBUMIN/GLOB SERPL: 1.5 {RATIO} (ref 1–2.5)
ALP SERPL-CCNC: 164 U/L (ref 35–104)
ALT SERPL-CCNC: 20 U/L (ref 5–33)
ANION GAP SERPL CALCULATED.3IONS-SCNC: 12 MMOL/L (ref 9–17)
AST SERPL-CCNC: 24 U/L
BASOPHILS # BLD: 0.04 K/UL (ref 0–0.2)
BASOPHILS NFR BLD: 0 % (ref 0–2)
BILIRUB SERPL-MCNC: 0.2 MG/DL (ref 0.3–1.2)
BUN SERPL-MCNC: 10 MG/DL (ref 6–20)
BUN/CREAT SERPL: 18 (ref 9–20)
CALCIUM SERPL-MCNC: 10 MG/DL (ref 8.6–10.4)
CHLORIDE SERPL-SCNC: 102 MMOL/L (ref 98–107)
CO2 SERPL-SCNC: 23 MMOL/L (ref 20–31)
CREAT SERPL-MCNC: 0.57 MG/DL (ref 0.5–0.9)
CREAT UR-MCNC: 30.5 MG/DL (ref 28–217)
EOSINOPHIL # BLD: 0.07 K/UL (ref 0–0.44)
EOSINOPHILS RELATIVE PERCENT: 1 % (ref 1–4)
ERYTHROCYTE [DISTWIDTH] IN BLOOD BY AUTOMATED COUNT: 12.4 % (ref 11.8–14.4)
GFR SERPL CREATININE-BSD FRML MDRD: >60 ML/MIN/1.73M2
GLUCOSE SERPL-MCNC: 79 MG/DL (ref 70–99)
HCT VFR BLD AUTO: 38.3 % (ref 36.3–47.1)
HGB BLD-MCNC: 13.1 G/DL (ref 11.9–15.1)
IMM GRANULOCYTES # BLD AUTO: 0.13 K/UL (ref 0–0.3)
IMM GRANULOCYTES NFR BLD: 1 %
LDH SERPL-CCNC: 260 U/L (ref 135–214)
LYMPHOCYTES # BLD: 19 % (ref 24–43)
LYMPHOCYTES NFR BLD: 2.12 K/UL (ref 1.1–3.7)
MCH RBC QN AUTO: 32 PG (ref 25.2–33.5)
MCHC RBC AUTO-ENTMCNC: 34.2 G/DL (ref 25.2–33.5)
MCV RBC AUTO: 93.4 FL (ref 82.6–102.9)
MONOCYTES NFR BLD: 1.06 K/UL (ref 0.1–1.2)
MONOCYTES NFR BLD: 10 % (ref 3–12)
NEUTROPHILS NFR BLD: 69 % (ref 36–65)
NEUTS SEG NFR BLD: 7.57 K/UL (ref 1.5–8.1)
NRBC AUTOMATED: 0 PER 100 WBC
PLATELET # BLD AUTO: 321 K/UL (ref 138–453)
PMV BLD AUTO: 10.2 FL (ref 8.1–13.5)
POTASSIUM SERPL-SCNC: 4.3 MMOL/L (ref 3.7–5.3)
PROT SERPL-MCNC: 6.8 G/DL (ref 6.4–8.3)
RBC # BLD AUTO: 4.1 M/UL (ref 3.95–5.11)
SODIUM SERPL-SCNC: 137 MMOL/L (ref 135–144)
TOTAL PROTEIN, URINE: 4 MG/DL
URATE SERPL-MCNC: 4.6 MG/DL (ref 2.4–5.7)
URINE TOTAL PROTEIN CREATININE RATIO: 0.13 (ref 0–0.2)
WBC OTHER # BLD: 11 K/UL (ref 3.5–11.3)

## 2023-05-23 PROCEDURE — 84156 ASSAY OF PROTEIN URINE: CPT

## 2023-05-23 PROCEDURE — 36415 COLL VENOUS BLD VENIPUNCTURE: CPT

## 2023-05-23 PROCEDURE — 80053 COMPREHEN METABOLIC PANEL: CPT

## 2023-05-23 PROCEDURE — 82570 ASSAY OF URINE CREATININE: CPT

## 2023-05-23 PROCEDURE — 84550 ASSAY OF BLOOD/URIC ACID: CPT

## 2023-05-23 PROCEDURE — 85025 COMPLETE CBC W/AUTO DIFF WBC: CPT

## 2023-05-23 PROCEDURE — 0502F SUBSEQUENT PRENATAL CARE: CPT | Performed by: ADVANCED PRACTICE MIDWIFE

## 2023-05-23 PROCEDURE — 83615 LACTATE (LD) (LDH) ENZYME: CPT

## 2023-05-23 NOTE — PROGRESS NOTES
S:  Presents for prenatal visit today. Reports baby is active and no contractions. Denies S&S of PEC, headaches, blurred vision, spots, epigastric pain. Reports pressure in pelvis. O:  MVSS, B/P elevated as above. DTR 2+, Clonus negative, edema negative. +FHT's, +FM, SVE tight 3 cm/90%/0 station, Stripped membranes. A:  25 yo   at 44 Weeks 0 Days SIUP, Gestational Hypertension, +FHT's  P:  Education, discussed PEC labs today, ds'd midwife's brew, danger S&S, will plan for IOL. within 24-48 hr.

## 2023-05-24 ENCOUNTER — TELEPHONE (OUTPATIENT)
Dept: OBGYN | Age: 28
End: 2023-05-24

## 2023-05-24 NOTE — TELEPHONE ENCOUNTER
Patient called the office stating she would receive a call from the hospital yesterday to be induced but she never received a call. She was wondering if Cody Hernandez has heard anything more regarding this? Please advise.

## 2023-07-12 ENCOUNTER — POSTPARTUM VISIT (OUTPATIENT)
Dept: OBGYN | Age: 28
End: 2023-07-12

## 2023-07-12 VITALS
HEART RATE: 80 BPM | OXYGEN SATURATION: 92 % | WEIGHT: 171 LBS | HEIGHT: 67 IN | BODY MASS INDEX: 26.84 KG/M2 | DIASTOLIC BLOOD PRESSURE: 62 MMHG | SYSTOLIC BLOOD PRESSURE: 116 MMHG

## 2023-07-12 DIAGNOSIS — Z30.09 ENCOUNTER FOR FEMALE FAMILY PLANNING COUNSELING: ICD-10-CM

## 2023-07-12 PROCEDURE — 0503F POSTPARTUM CARE VISIT: CPT | Performed by: ADVANCED PRACTICE MIDWIFE

## 2023-07-12 ASSESSMENT — ENCOUNTER SYMPTOMS
RESPIRATORY NEGATIVE: 1
GASTROINTESTINAL NEGATIVE: 1
EYES NEGATIVE: 1

## 2023-07-12 NOTE — PROGRESS NOTES
Status: She is alert and oriented to person, place, and time. Deep Tendon Reflexes: Reflexes are normal and symmetric. Psychiatric:         Mood and Affect: Mood normal.         Thought Content: Thought content normal.         Assessment:      Diagnosis Orders   1. Postpartum care and examination of lactating mother        2. Encounter for female family planning counseling                Plan:   Education: family planning, spouse is still planning on vasectomy, they plan on condom use in the meantime. May resume all activities. Continue with PNV and vitamin D3. RTO 6 months for annual and prn.

## 2024-10-21 NOTE — PROGRESS NOTES
S:  Presents for prenatal visit today. Reports baby is active and no contractions. No concerns. O:  MVSS, Afebrile. Abdomen gravid, nontender S=D, +FHT's, +FM  A:  23 yo  at 28 Weeks 0 Days SIUP, +FHT's  P:  Education: discussed and reviewed FMI, PTL, danger S&S, when to call. Pre-registration paperwork for [facility] given.  RTO 2 weeks tablet by mouth every 6 hours as needed for Pain (with food)    PREDNISONE (DELTASONE) 20 MG TABLET    Take 2 tablets by mouth daily (with breakfast) for 6 days       DISCONTINUED MEDICATIONS:  Discontinued Medications    No medications on file              (Please note the MDM and HPI sections of this note were completed with a voice recognition program.  Efforts were made to edit the dictations but occasionally words are mis-transcribed.)    Electronically signed, Castro Brown PA-C,          Castro Brown PA-C  10/21/24 [id number]